# Patient Record
Sex: MALE | Race: WHITE
[De-identification: names, ages, dates, MRNs, and addresses within clinical notes are randomized per-mention and may not be internally consistent; named-entity substitution may affect disease eponyms.]

---

## 2019-01-04 ENCOUNTER — HOSPITAL ENCOUNTER (OUTPATIENT)
Dept: HOSPITAL 56 - MW.SDS | Age: 64
Discharge: HOME | End: 2019-01-04
Attending: SURGERY
Payer: COMMERCIAL

## 2019-01-04 VITALS — DIASTOLIC BLOOD PRESSURE: 72 MMHG | SYSTOLIC BLOOD PRESSURE: 113 MMHG

## 2019-01-04 DIAGNOSIS — R19.4: ICD-10-CM

## 2019-01-04 DIAGNOSIS — K57.30: ICD-10-CM

## 2019-01-04 DIAGNOSIS — H91.93: ICD-10-CM

## 2019-01-04 DIAGNOSIS — K64.8: ICD-10-CM

## 2019-01-04 DIAGNOSIS — K20.9: Primary | ICD-10-CM

## 2019-01-04 DIAGNOSIS — Z87.891: ICD-10-CM

## 2019-01-04 DIAGNOSIS — K31.7: ICD-10-CM

## 2019-01-04 PROCEDURE — 45380 COLONOSCOPY AND BIOPSY: CPT

## 2019-01-04 PROCEDURE — 43239 EGD BIOPSY SINGLE/MULTIPLE: CPT

## 2019-01-04 NOTE — PCM.OPNOTE
- General Post-Op/Procedure Note


Date of Surgery/Procedure: 01/04/19


Operative Procedure(s): egd w bx.  colonoscopy w bx


Findings: 





see dict [id number]


Pre Op Diagnosis: esophagitis and change in bowel habbits


Post-Op Diagnosis: Same


Anesthesia Technique: Moderate Sedation


Primary Surgeon: Joshua De La Cruz


Pathology: 





egd bx


rectal bx


Complications: None


Condition: Good

## 2019-01-04 NOTE — OR
SURGEON:

Joshua De La Cruz MD

 

DATE OF PROCEDURE:  01/04/2019

 

PREOPERATIVE DIAGNOSES:

1. Severe esophagitis history.

2. Change in bowel habits.

 

PROCEDURES PERFORMED:

Esophagogastroduodenoscopy with biopsy, and colonoscopy with biopsy.

 

PROCEDURE IN DETAIL:

EGD: The patient was taken to the endoscopy room, and with the CRNA, Diprivan

was administered.

 

A well-lubricated EGD scope was gently inserted through the oropharynx, down the

esophagus, passing through the gastroesophageal junction, into the stomach.  The

mucosa was examined upon the passage.  Any etiology will be noted.  Once in the

stomach, we continued to advance to the distal antrum, passed through the

pylorus into the second portion of the duodenum.  Again, the mucosa was examined

for any abnormality and etiology.

 

The scope was then retrieved back to the stomach and then retroflexed to look at

the fundus of the stomach.  If a biopsy was indicated, we will biopsy the

antrum, body, and gastroesophageal junction.  The air will be sucked out while

the scope is retrieved to reduce the patient's discomfort.

 

The patient tolerated the procedure well.  There were no intraoperative

complications.  Dr. De La Cruz was present through the whole procedure.

 

Prior to surgery, a time-out had been called, the patient identified, procedure

identified and antibiotic administered.

 

Colonoscopy procedure:

 

The patient was taken to the endoscopy room.  A time out was called, patient

identified, and procedure identified.  Diprivan was then administrated.  Patient

went from awake to sleep, hearing doctor talking or door closing is normal.

Perineum inspection and digital examination were then performed.  A well-

lubricated colonoscope was gently inserted through the rectum, advanced past the

rectosigmoid junction, the descending colon, splenic flexure, transverse colon,

hepatic flexure, ascending colon, arrived to the cecum.  Cecum was identified as

dictated in the finding.  Then the scope was carefully withdrawn while attention

was paid to the mucosal surface for any abnormality.  Air will be sucked out

during the scope withdrawal.  At the rectum, retroflexed to examine any rectal

diseases, fistula or hemorrhoids.  During mucosal examination, abnormality 

was noted; biopsy performed.  Patient tolerated

procedure well.  There were no intraoperative complications, and Dr. De La Cruz was

present throughout the whole procedure.

 

FINDINGS:

EGD findings:

1. The patient is easily sedated with CRNA and Diprivan.  The patient is

    soundly snoring.

2. Oropharynx and proximal esophagus are free of disease.  The distal

    esophagus have maybe early Schatzki ring and also moderate salmon-colored

    change consistent with moderate acid reflux.  Stomach rugae is normal in

    appearance.  Antrum is totally normal and duodenum is grossly normal.

    Scope retrieved back to the stomach.  Retroflexed look at the fundus of

    stomach, there is no hiatal hernia.  On the greater curvature, there is a

    large amount of polyp.  There were small couple of them were being biopsied

    and also biopsy done at antrum, body, GE junction at 40 and sucked out the

    air while scope coming out.  During the whole study, there was no ulcer, no

    blood, no bile, no food particle.

Colonoscopy findings:

1. The patient is easily sedated with CRNA and Diprivan.  The patient is

    soundly snoring.

2. Bowel prep is suboptimum and marginally acceptable, not quite sure.  Bowel

    prep is below average, but is acceptable.  The patient has a lot of stool

    blocks from the tremendous amount of diverticula observed.  So, the bowel

    prep was said as good but the stool block cannot get out from the

    diverticula and obscure the colonoscope, and block the colonoscope and

    makes the colonoscopy first time compromised because half the mariposa was

    covered by the stool ball.  The scope was then retrieved, took out and

    cleaned up again, and then redo second time colonoscopy because of the

    stool ball, the second time is much more efficient.  Cecum indicated by

    ileocecal fold, one-to-one indentation, light emittance, and appendiceal

    orifice.  Mucosa examined upon scope pulling out.  The patient does not

    have polyp, mass, growth, inflammation, stricture, ulceration, AV

    malformation, blood, ulcer, none of those, but at the rectum, there are

    moderate internal hemorrhoids, and also one area is a little bit prominent,

    it was biopsied.  The patient has mild external hemorrhoids, so the little

    thing we saw while doing rectal examination is internal hemorrhoid, not a

    polyp and a biopsy was done.  The patient would benefit from repeat

    colonoscopy in 10 years from today or if clinically indicated otherwise.

    The patient has tremendous amount of diverticula, mostly in the left, a

    little bit in the transverse, a little bit in right colon, so patient has

    pan diverticulosis.  No signs or symptoms of diverticulitis and again,

    patient will benefit from repeat colonoscopy in 10 years from today or if

    clinically indicated otherwise.

As always, thank you for the kind referral.

 

 

PATRICIA CANTRELL

DD:  01/04/2019 10:17:01

DT:  01/04/2019 12:41:18

Job #:  [id number]

JUAN

## 2019-01-04 NOTE — PCM.PREANE
Preanesthetic Assessment





- Anesthesia/Transfusion/Family Hx


Anesthesia History: Prior Anesthesia Without Reaction


Other Type of Anesthesia Reaction Comment: DENIES ANY PROBLEMS WITH ANESTHESIA


Family History of Anesthesia Reaction: No


Transfusion History: No Prior Transfusion(s)


Intubation History: Unknown





- Review of Systems


General: No Symptoms


Pulmonary: No Symptoms


Cardiovascular: No Symptoms


Gastrointestinal: Diarrhea ('leaking of watery discharges')


Neurological: No Symptoms, Change in Speech





- Physical Assessment


O2 Sat by Pulse Oximetry: 96


Respiratory Rate: 16


Vital Signs: 





 Last Vital Signs











Temp  36.2 C   01/04/19 08:02


 


Pulse  74   01/04/19 08:02


 


Resp  16   01/04/19 08:02


 


BP  128/84   01/04/19 08:02


 


Pulse Ox  96   01/04/19 08:02











Height: 1.83 m


Weight: 98.43 kg


ASA Class: 2


Mental Status: Alert & Oriented x3


Airway Class: Mallampati = 2


Dentition: Reports: Normal Dentition, Crown(s) (multiple upper and lower (right 

and left - back))


Thyro-Mental Finger Breadths: 3


Mouth Opening Finger Breadths: 3


ROM/Head Extension: Limited/Partial


Lungs: Clear to Auscultation, Normal Respiratory Effort


Cardiovascular: Regular Rate, Regular Rhythm





- Allergies


Allergies/Adverse Reactions: 


 Allergies











Allergy/AdvReac Type Severity Reaction Status Date / Time


 


No Known Allergies Allergy   Verified 12/31/18 14:13














- Blood


Blood Available: No





- Anesthesia Plan


Pre-Op Medication Ordered: None





- Acknowledgements


Anesthesia Type Planned: MAC


Pt an Appropriate Candidate for the Planned Anesthesia: Yes


Alternatives and Risks of Anesthesia Discussed w Pt/Guardian: Yes


Pt/Guardian Understands and Agrees with Anesthesia Plan: Yes





PreAnesthesia Questionnaire





- Past Health History


Medical/Surgical History: Denies Medical/Surgical History


HEENT History: Reports: Other (See Below)


Other HEENT History: wears glasses


Cardiovascular History: Reports: None


Respiratory History: Reports: None


Gastrointestinal History: Reports: Gastritis, GERD, Hemorrhoids


Genitourinary History: Reports: Prostate Disorder


Musculoskeletal History: Reports: Arthritis


Other Musculoskeletal History: has arthritis in hands


Neurological History: Reports: Concussion


Psychiatric History: Reports: None


Endocrine/Metabolic History: Reports: None


Hematologic History: Reports: None


Immunologic History: Reports: None


Oncologic (Cancer) History: Reports: Basal Cell Carcinoma, Prostate


Other Oncologic History: hx skin cancer


Dermatologic History: Reports: Venous Stasis Dermatitis


Other Dermatologic History: due to varicose veins





- Past Surgical History


Head Surgeries/Procedures: Reports: None


Cardiovascular Surgical History: Reports: Varicose


Other Cardiovascular Surgeries/Procedures: hx of varicose vein ligations


GI Surgical History: Reports: Colonoscopy, EGD


Male  Surgical History: Reports: Vasectomy


Musculoskeletal Surgical History: Reports: Other (See Below)


Other Musculoskeletal Surgeries/Procedures:: repair of hammer toe right foot (

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


Dermatological Surgical History: Reports: Skin Biopsy





- SUBSTANCE USE


Smoking Status *Q: Former Smoker


Tobacco Use Within Last Twelve Months: No


Days Per Week of Alcohol Use: 7


Number of Drinks Per Day: 1


Total Drinks Per Week: 7


Recreational Drug Use History: No





- HOME MEDS


Home Medications: 


 Home Meds





Omeprazole [priLOSEC OTC] 20 mg PO DAILY 07/18/14 [History]


Cyanocobalamin (Vitamin B12) [Vitamin B12] 1,000 mcg PO DAILY 10/27/16 [History]


Loratadine [Claritin] 10 mg PO ASDIRECTED PRN 10/27/16 [History]


Aspirin [Adult Low Dose Aspirin EC] 81 mg PO DAILY 12/31/18 [History]


Clobetasol [Clobetasol Propionate 0.05% Cream] 1 dose TOP BID PRN 12/31/18 [

History]











- CURRENT (IN HOUSE) MEDS


Current Meds: 





 Current Medications





Lactated Ringer's (Ringers, Lactated)  1,000 mls @ 125 mls/hr IV ASDIRECTED MANDA


   Last Admin: 01/04/19 08:10 Dose:  125 mls/hr





Discontinued Medications





Fentanyl (Sublimaze) Confirm Administered Dose 100 mcg .ROUTE .STK-MED ONE


   Stop: 01/04/19 07:05


Lidocaine (Xylocaine-Mpf 2%) Confirm Administered Dose 5 ml .ROUTE .STK-MED ONE


   Stop: 01/04/19 07:05


Midazolam HCl (Versed 1 Mg/Ml) Confirm Administered Dose 2 mg .ROUTE .STK-MED 

ONE


   Stop: 01/04/19 07:05


Propofol (Diprivan  20 Ml) Confirm Administered Dose 400 mg .ROUTE .STK-MED ONE


   Stop: 01/04/19 07:05